# Patient Record
Sex: FEMALE | Race: OTHER | HISPANIC OR LATINO | URBAN - METROPOLITAN AREA
[De-identification: names, ages, dates, MRNs, and addresses within clinical notes are randomized per-mention and may not be internally consistent; named-entity substitution may affect disease eponyms.]

---

## 2023-06-14 ENCOUNTER — EMERGENCY (EMERGENCY)
Facility: HOSPITAL | Age: 78
LOS: 1 days | Discharge: HOME | End: 2023-06-14
Attending: EMERGENCY MEDICINE | Admitting: EMERGENCY MEDICINE
Payer: MEDICAID

## 2023-06-14 VITALS
DIASTOLIC BLOOD PRESSURE: 75 MMHG | SYSTOLIC BLOOD PRESSURE: 160 MMHG | HEART RATE: 71 BPM | TEMPERATURE: 99 F | RESPIRATION RATE: 18 BRPM | OXYGEN SATURATION: 96 %

## 2023-06-14 VITALS
TEMPERATURE: 98 F | DIASTOLIC BLOOD PRESSURE: 90 MMHG | RESPIRATION RATE: 18 BRPM | HEART RATE: 88 BPM | SYSTOLIC BLOOD PRESSURE: 160 MMHG | OXYGEN SATURATION: 98 %

## 2023-06-14 PROCEDURE — 99283 EMERGENCY DEPT VISIT LOW MDM: CPT

## 2023-06-14 PROCEDURE — 99053 MED SERV 10PM-8AM 24 HR FAC: CPT

## 2023-06-14 RX ORDER — DOCUSATE SODIUM 100 MG
1 CAPSULE ORAL
Qty: 28 | Refills: 0
Start: 2023-06-14 | End: 2023-06-27

## 2023-06-14 RX ORDER — DOCUSATE SODIUM 100 MG
1 CAPSULE ORAL
Qty: 30 | Refills: 0
Start: 2023-06-14 | End: 2023-07-13

## 2023-06-14 NOTE — ED PROVIDER NOTE - NSFOLLOWUPINSTRUCTIONS_ED_ALL_ED_FT
Lo vieron en la abdoulaye de emergencias hoy debido a un dolor en el brazo debido a un lipoma y dolor de bill.    También le recomendamos que jelani un seguimiento con un neurólogo para un mayor control y evaluación de de la o dolor de bill. Puede pelon a un cirujano general para la extracción de de la o lipoma.    Le hemos informado al equipo de Neurolog[facility] de [facility] sobre de la o char. Se pondrán en contacto con [facility] para tratar de concertar derrick darryl. También puede llamar al [phone] y solicitar derrick darryl en un lugar conveniente.    Hemos enviado medicación a de la o [place]. Se llama Colace. [place] es para el estreñimiento.    Regrese a la abdoulaye de emergencias si:  •Tiene entumecimiento o caída en un lado de la enrique  •Tiene debilidad en un brazo o derrick pierna  •Tiene confusión o dificultad para hablar  •Tiene mareos, dolor de bill intenso o pérdida de la visión  •Tiene derrick convulsión.  •Tiene dolor en el pecho o dificultad para respirar.  •Siente el brazo o la pierna caliente, sensible y adolorido. Puede verse hinchado y randolph.  •Tiene pérdida de equilibrio o coordinación.  •Tiene visión doble o pérdida de la visión.

## 2023-06-14 NOTE — ED PROVIDER NOTE - CLINICAL SUMMARY MEDICAL DECISION MAKING FREE TEXT BOX
Jules, PGY2 - 79yo woman with PMH HTN, HLD, presenting as a transfer from [facility] due to lipoma pain and headache. There was concern for babinski sign at [facility], and thus they wanted MRIs to rule out cord compression. Patient has 5/5 strength in the BUE and BLE, no sensation deficits in all extremities, denies urinary incontinence or retention. HPI and exam not consistent with cord compression. CTs done before transfer show no significant stenosis, patient asymptomatic at this time, states that the symptoms she felt prior to arrival were similar to her chronic lipoma pain radiation. Low suspicion for central etiology of her pain, history consistent with peripheral nerve/muscle irritation. Based on imaging and lab results, have a low inclination to repeat workup due to non-actionable lab findings and asymptomatic at this time. *The above represents an initial assessment/impression. Please refer to progress notes for potential changes in patient clinical course*

## 2023-06-14 NOTE — ED PROVIDER NOTE - PHYSICAL EXAMINATION
Gen: NAD, AOx3, able to make needs known, non-toxic  Head: NCAT  HEENT: EOMI, normal conjunctiva  Lung: CTAB, no respiratory distress, no wheezes/rhonchi/rales B/L, speaking in full sentences  CV: RRR, no M/R/G, pulses bilaterally   Abd: soft, NTND, no guarding, no CVA tenderness  MSK: no visible bony deformities  Neuro: No focal sensory or motor deficits, 5/5 strength in BUE and BLE, CN 3-12 intact, no pronator shift, no slurred speech   Skin: Warm, well perfused, no rash  Psych: normal affect Gen: NAD, AOx3, able to make needs known, non-toxic  Head: NCAT  HEENT: EOMI, normal conjunctiva  Lung: CTAB, no respiratory distress, no wheezes/rhonchi/rales B/L, speaking in full sentences  CV: RRR, no M/R/G, pulses bilaterally   Abd: soft, NTND, no guarding, no CVA tenderness  MSK: no visible bony deformities  Neuro: No focal sensory or motor deficits, 5/5 strength in BUE and BLE, CN 3-12 intact, no pronator shift, no slurred speech   Skin: Warm, well perfused, no rash. +movable and soft lipoma over the left upper arm, lipoma present in right upper back, both nontender at this time   Psych: normal affect

## 2023-06-14 NOTE — ED PROVIDER NOTE - PATIENT PORTAL LINK FT
You can access the FollowMyHealth Patient Portal offered by [facility] by registering at the following website: [email or web address]. By joining [facility]’s FollowMyHealth portal, you will also be able to view your health information using other applications (apps) compatible with our system.

## 2023-06-14 NOTE — ED ADULT TRIAGE NOTE - CHIEF COMPLAINT QUOTE
jayda from [facility] for "MRI to r/o TIA"- pt AAOx4 and states she went to ED for a "bump to her L arm and HA x a few days"- hx HTN

## 2023-06-14 NOTE — ED ADULT NURSE NOTE - OBJECTIVE STATEMENT
Patient transfer from [facility] due to chronic left upper arm pain due to lipoma and headache x 4 days. Denies numbness/tingling/ dizziness and blurry vision. patient appears comfortable. safety maintained.

## 2023-06-14 NOTE — ED ADULT NURSE NOTE - NS ED PATIENT SAFETY CONCERN
Discussion/Summary   Please let the patient know that her Pap was normal and her HPV was negative, she will need a repeat Pap in 5 years  Verified Results  ([facility]) Pap Lb, HPV-hr [id number] 12:00AM Pete Gregorio   [id number]  No  of containers  [place] Organ 01 ThinPrep Vial     Test Name Result Flag Reference   DIAGNOSIS:      NEGATIVE FOR INTRAEPITHELIAL LESION AND MALIGNANCY  THE CYTOLOGY PROCESSING WAS PERFORMED AT THE [facility] LOCATED AT  [facility], [address], [address] [place] [id number]  NEGATIVE FOR INTRAEPITHELIAL LESION AND MALIGNANCY  THE CYTOLOGY PROCESSING WAS PERFORMED AT THE [facility] LOCATED AT  [facility], [address], [address] [place] [phone]  Specimen adequacy:      Satisfactory for evaluation  Endocervical and/or squamous metaplastic  cells (endocervical component) are present  Satisfactory for evaluation  Endocervical and/or squamous metaplastic  cells (endocervical component) are present  Clinician provided ICD10: U83 606     [id number]   Performed by:      Philomena Kennedy, Cytotechnologist (ASCP)   Lachelle Pope Cytotechnologist (ASCP)   Comm   Note: (Report)     The Pap smear is a screening test designed to aid in the detection of  premalignant and malignant conditions of the uterine cervix  It is not a  diagnostic procedure and should not be used as the sole means of detecting  cervical cancer  Both false-positive and false-negative reports do occur  The Pap smear is a screening test designed to aid in the detection of  premalignant and malignant conditions of the uterine cervix  It is not a  diagnostic procedure and should not be used as the sole means of detecting  cervical cancer  Both false-positive and false-negative reports do occur  HPV, high-risk Negative  Negative   This high-risk HPV test detects thirteen high-risk types  (16/18/31/33/35/39/45/51/52/56/58/59/68) without differentiation  No

## 2023-06-14 NOTE — ED PROVIDER NOTE - NSPTACCESSSVCSAPPTDETAILS_ED_ALL_ED_FT
Neurology: Please help make appointment asap for headache followup     General Surgery: Please help make appointment within 1 week for consultation for symptomatic arm lipoma removal    patient Bel[place] speaking

## 2023-06-14 NOTE — ED PROVIDER NOTE - OBJECTIVE STATEMENT
78-year-old woman with PMH HTN, HLD presenting as a transfer from [facility] due to arm pain from a chronic left upper arm lipoma and a headache for 4 days.  Patient states that her lipoma pain sometimes radiates down her arm.  Upon reviewing documentation from [facility] they document the patient's chief complaint was left arm numbness at 6 AM; patient denies numbness and tingling of her upper and lower extremities here.  A code stroke was called at that time, patient had a CAT scan of her brain and a neurology consult. According to documentation per neurology that was done at [facility] she had a left arm positive Tinel's sign, +bilateral babinski sign. labs did not show significant electrolyte imbalances, no white count, no significant anemia, no pneumonia/pneumothorax/effusions on chest x-ray.  due to the bilateral Babinski on exam they were concerned for a cord compression, and thus was transferred to [facility] for MRIs of her brain, cervical, thoracic, lumbar spine.  Multiple other facilities rejected transfer request.  CT head done at [facility] did not show significant stenosis or aneurysms.  CTA head and neck showed tortuosity of the proximal left internal carotid arteries and the cavernous segments of the internal carotid arteries without significant stenosis. Patient denies fevers, chills, spinal pain, spinal interventions, numbness and tingling of her upper or lower extremities, numbness and tingling of the groin, urinary incontinence or retention.

## 2023-06-14 NOTE — ED PROVIDER NOTE - PROGRESS NOTE DETAILS
Jules, PGY2 - Spoke with daughter Dahiana in [place] [phone], aware of patient workup at [facility] and plan, in agreement with mothers decision to get neurology workup outpatient, gen surgery consult for lipoma removal. Patient stable for discharge. Understands the Emergency Room work-up and discharge precautions. Will follow-up with neuro and surgery

## 2023-06-14 NOTE — ED PROVIDER NOTE - ATTENDING CONTRIBUTION TO CARE
HPI: 78-year-old woman with PMH HTN, HLD presenting as a transfer from [facility] due to arm pain from a chronic left upper arm lipoma and a headache for 4 days.  Patient states that her lipoma pain sometimes radiates down her arm.  Upon reviewing documentation from [facility] they document the patient's chief complaint was left arm numbness at 6 AM; patient DENIES numbness and tingling of her upper and lower extremities here.  A code stroke was called at that time, patient had a CAT scan of her brain and a neurology consult. According to documentation per neurology that was done at [facility] she had a left arm positive Tinel's sign, +bilateral babinski sign. labs did not show significant electrolyte imbalances, no white count, no significant anemia, no pneumonia/pneumothorax/effusions on chest x-ray.  due to the bilateral Babinski on exam they were concerned for a cord compression, and thus was transferred to [facility] for MRIs of her brain, cervical, thoracic, lumbar spine.  Multiple other facilities rejected transfer request.  CT head done at [facility] did not show significant stenosis or aneurysms.  CTA head and neck showed tortuosity of the proximal left internal carotid arteries and the cavernous segments of the internal carotid arteries without significant stenosis. Patient denies fevers, chills, spinal pain, spinal interventions, numbness and tingling of her upper or lower extremities, numbness and tingling of the groin, urinary incontinence or retention. Pt at this time unsure why she was transferred to [facility] and has no symptoms.   EXAM: Awake, alert, CN 2-12 intact, finger to nose intact, gait steady, moving all 4 ext actively, with FROM, no paresthesias on exam, heart RRR, lungs ctab, abd soft, nontender, No C/T/L spine tenderness to palpation of stepoffs.   MDM: pt with acute on chronic arm pain from known lipoma that is here from [facility] for MRI although pt unsure why she is here and went to [facility] for left arm pain, code stroke was called and pt was eval'd by neuro and due to inability to obtain MRI per their recs they tried to transfer to other OSH but was refused so transferred here to [facility]. Here pt has no signs and symptoms of cord compression and no signs and symptoms of stroke/CVA. Pt has no headache, no complaints of paresthesias or weakness, no N/V or focal deficits. Pt would like to be discharged with Rx for colace due to constipation which is chronic. No incontinence reported. Will advise f/u with PMD ASAP or return to ED for any abnormalities. Daughter aware and will take pt home and will f/u with PMD.

## 2023-06-14 NOTE — ED ADULT NURSE NOTE - CHIEF COMPLAINT QUOTE
jayda from [facility] for "MRI to r/o TIA"- pt AAOx4 and states she went to ED for a "bump to her L arm and HA x a few days"- hx HTN

## 2023-06-14 NOTE — ED ADULT NURSE NOTE - NSFALLUNIVINTERV_ED_ALL_ED
Bed/Stretcher in lowest position, wheels locked, appropriate side rails in place/Call bell, personal items and telephone in reach/Instruct patient to call for assistance before getting out of bed/chair/stretcher/Non-slip footwear applied when patient is off stretcher/[place] to call system/Physically safe environment - no spills, clutter or unnecessary equipment/Purposeful proactive rounding/Room/bathroom lighting operational, light cord in reach

## 2023-07-20 NOTE — ED ADULT NURSE NOTE - PAIN: PRESENCE, MLM
Santos Borges called and stated that she spoke to the pharmacist and they told her that they do not have the prescription for amlodipine. Santos Borges also said that the pharmacist told her that the celebrex is for swelling and pain. Santos Borges stated that she has no swelling or pain. So she is not going to take it. complains of pain/discomfort